# Patient Record
Sex: MALE | URBAN - METROPOLITAN AREA
[De-identification: names, ages, dates, MRNs, and addresses within clinical notes are randomized per-mention and may not be internally consistent; named-entity substitution may affect disease eponyms.]

---

## 2018-07-17 ENCOUNTER — APPOINTMENT (RX ONLY)
Dept: URBAN - METROPOLITAN AREA CLINIC 68 | Facility: CLINIC | Age: 48
Setting detail: DERMATOLOGY
End: 2018-07-17

## 2018-07-17 DIAGNOSIS — L71.8 OTHER ROSACEA: ICD-10-CM

## 2018-07-17 PROCEDURE — ? PRESCRIPTION

## 2018-07-17 PROCEDURE — 99201: CPT

## 2018-07-17 PROCEDURE — ? COUNSELING

## 2018-07-17 RX ORDER — AZELAIC ACID 0.15 G/G
GEL TOPICAL
Qty: 1 | Refills: 5 | Status: ERX | COMMUNITY
Start: 2018-07-17

## 2018-07-17 RX ADMIN — AZELAIC ACID: 0.15 GEL TOPICAL at 15:12

## 2018-07-17 ASSESSMENT — LOCATION ZONE DERM
LOCATION ZONE: NOSE
LOCATION ZONE: FACE

## 2018-07-17 ASSESSMENT — LOCATION DETAILED DESCRIPTION DERM
LOCATION DETAILED: LEFT CENTRAL MALAR CHEEK
LOCATION DETAILED: LEFT NASAL ALA
LOCATION DETAILED: RIGHT MEDIAL MALAR CHEEK
LOCATION DETAILED: NASAL ROOT
LOCATION DETAILED: NASAL DORSUM

## 2018-07-17 ASSESSMENT — LOCATION SIMPLE DESCRIPTION DERM
LOCATION SIMPLE: LEFT CHEEK
LOCATION SIMPLE: RIGHT CHEEK
LOCATION SIMPLE: NOSE
LOCATION SIMPLE: LEFT NOSE

## 2018-07-17 NOTE — HPI: EVALUATION OF SKIN LESION(S)
How Severe Are Your Spot(S)?: moderate
Have Your Spot(S) Been Treated In The Past?: has not been treated
Hpi Title: Evaluation of Skin Lesions
Additional History: Patient is here for evaluation of spots on face